# Patient Record
Sex: FEMALE
[De-identification: names, ages, dates, MRNs, and addresses within clinical notes are randomized per-mention and may not be internally consistent; named-entity substitution may affect disease eponyms.]

---

## 2019-04-24 ENCOUNTER — INBOUND DOCUMENT (OUTPATIENT)
Age: 39
End: 2019-04-24

## 2019-05-06 ENCOUNTER — APPOINTMENT (OUTPATIENT)
Dept: OTOLARYNGOLOGY | Facility: CLINIC | Age: 39
End: 2019-05-06
Payer: COMMERCIAL

## 2019-05-06 VITALS
HEIGHT: 66 IN | OXYGEN SATURATION: 98 % | BODY MASS INDEX: 23.3 KG/M2 | SYSTOLIC BLOOD PRESSURE: 116 MMHG | WEIGHT: 145 LBS | TEMPERATURE: 98.4 F | DIASTOLIC BLOOD PRESSURE: 72 MMHG | HEART RATE: 78 BPM

## 2019-05-06 DIAGNOSIS — Z78.9 OTHER SPECIFIED HEALTH STATUS: ICD-10-CM

## 2019-05-06 DIAGNOSIS — E04.1 NONTOXIC SINGLE THYROID NODULE: ICD-10-CM

## 2019-05-06 DIAGNOSIS — Z86.79 PERSONAL HISTORY OF OTHER DISEASES OF THE CIRCULATORY SYSTEM: ICD-10-CM

## 2019-05-06 DIAGNOSIS — Z83.3 FAMILY HISTORY OF DIABETES MELLITUS: ICD-10-CM

## 2019-05-06 DIAGNOSIS — Z80.9 FAMILY HISTORY OF MALIGNANT NEOPLASM, UNSPECIFIED: ICD-10-CM

## 2019-05-06 PROCEDURE — 99244 OFF/OP CNSLTJ NEW/EST MOD 40: CPT

## 2019-05-06 RX ORDER — ERGOCALCIFEROL 1.25 MG/1
CAPSULE ORAL
Refills: 0 | Status: ACTIVE | COMMUNITY

## 2019-05-06 RX ORDER — NORGESTIMATE AND ETHINYL ESTRADIOL 7DAYSX3 LO
KIT ORAL
Refills: 0 | Status: ACTIVE | COMMUNITY

## 2019-05-06 RX ORDER — AMLODIPINE BESYLATE 10 MG/1
10 TABLET ORAL
Refills: 0 | Status: ACTIVE | COMMUNITY

## 2019-05-06 NOTE — HISTORY OF PRESENT ILLNESS
[de-identified] : 37 y/o F with a h/o an anterior neck cyst for the past 8 years.  She reports no symptoms related to the lesion and states that it has not grown over the years.  It has not been infected.

## 2019-05-06 NOTE — CONSULT LETTER
[Dear  ___] : Dear  [unfilled], [Please see my note below.] : Please see my note below. [Consult Letter:] : I had the pleasure of evaluating your patient, [unfilled]. [Consult Closing:] : Thank you very much for allowing me to participate in the care of this patient.  If you have any questions, please do not hesitate to contact me. [Sincerely,] : Sincerely, [FreeTextEntry3] : Bart Pretty MD, FACS\par Clinical \par Dept. of Otolaryngology\par [facility]\par  [DrAylin  ___] : Dr. LEE [FreeTextEntry2] : Jann Nichols MD

## 2019-05-06 NOTE — DATA REVIEWED
[de-identified] : Images reviewed:\par \par Sono thyroid ([facility] 8/22/11) - 23 x 13 x 19 complex cyst adjacent to the thyroid isthmus\par \par Sono thyroid ([facility] 3/10/18) - 27 x 14 x 27 mm cyst, largely unchanged compared to 2011.  Debris within  [de-identified] : MRI neck ([facility] 9/12/11) - Images reviewed. 18 mm cystic lesion embedded within the strap muscles.

## 2019-05-06 NOTE — PHYSICAL EXAM
[Midline] : trachea located in midline position [Normal] : no rashes [de-identified] : Approx 2.5 cm soft, nontender cystic lesion at sternal notch.

## 2019-05-06 NOTE — REVIEW OF SYSTEMS
[Sneezing] : sneezing [Seasonal Allergies] : seasonal allergies [Sinus Pain] : sinus pain [Sinus Pressure] : sinus pressure [Eyes Itch] : itching of the eyes [Cough] : cough [Negative] : Heme/Lymph [Ear Pain] : no ear pain [Ear Itch] : no ear itch [Hearing Loss] : no hearing loss [Vertigo] : no vertigo [Recurrent Ear Infections] : no recurrent ear infections [Ear Drainage] : no ear drainage [Ear Noises] : no ear noises [Lightheadedness] : no lightheadedness [Nasal Congestion] : no nasal congestion [Nose Bleeds] : no nose bleeds [Problem Snoring] : no snoring problems [Recurrent Sinus Infections] : no recurrent sinus infections [Discolored Nasal Discharge] : no discolored nasal discharge [Snoring With Pauses] : no snoring with pauses [Sense Of Smell Problem] : no sense of smell problem [Throat Pain] : no throat pain [Throat Clearing] : no throat clearing [Hoarseness] : no hoarseness [Throat Dryness] : no throat dryness [Throat Itching] : no throat itching [Swelling Neck] : no neck swelling [Fever] : no fever [Chills] : no chills [Swelling Face] : no face swelling [Feeling Poorly] : not feeling poorly [Feeling Tired] : not feeling tired [Recent Weight Gain (___ Lbs)] : no recent weight gain [Red Eyes] : eyes not red [Recent Weight Loss (___ Lbs)] : no recent weight loss [Eye Pain] : no eye pain [Eyesight Problems] : no eyesight problems [Discharge From Eyes] : no purulent discharge from the eyes [Dry Eyes] : no dry eyes [Chest Pain] : no chest pain [Heart Rate Is Slow] : the heart rate was not slow [Heart Rate Is Fast] : the heart rate was not fast [Lower Ext Edema] : no lower extremity edema [Palpitations] : no palpitations [Leg Claudication] : no intermittent leg claudication [SOB on Exertion] : no shortness of breath during exertion [Shortness Of Breath] : no shortness of breath [Wheezing] : no wheezing [PND] : no PND [Abdominal Pain] : no abdominal pain [Orthopnea] : no orthopnea [Vomiting] : no vomiting [Diarrhea] : no diarrhea [Constipation] : no constipation [Melena] : no melena [Heartburn] : no heartburn [Incontinence] : no incontinence [Pelvic Pain] : no pelvic pain [Dysuria] : no dysuria [Arthralgias] : no arthralgias [Vaginal Discharge] : no vaginal discharge [Dysmenorrhea] : no dysmenorrhea [Abn Vaginal Bleeding] : no unexplained vaginal bleeding [Joint Stiffness] : no joint stiffness [Joint Pain] : no joint pain [Joint Swelling] : no joint swelling [Limb Swelling] : no limb swelling [Limb Pain] : no limb pain [Skin Wound] : no skin wound [Skin Lesions] : no skin lesions [Itching] : no itching [Breast Lump] : no breast lump [Confusion] : no confusion [Breast Pain] : no breast pain [Change In A Mole] : no change in a mole [Dizziness] : no dizziness [Fainting] : no fainting [Convulsions] : no convulsions [Difficulty Walking] : no difficulty walking [Limb Weakness] : no limb weakness [Suicidal] : not suicidal [Anxiety] : no anxiety [Sleep Disturbances] : no sleep disturbances [Depression] : no depression [Change In Personality] : no personality change [Proptosis] : no proptosis [Emotional Problems] : no emotional problems [Hot Flashes] : no hot flashes [Muscle Weakness] : no muscle weakness [Deepening Of The Voice] : no deepening of the voice [Feelings Of Weakness] : no feelings of weakness [Swollen Glands] : no swollen glands [Easy Bruising] : no tendency for easy bruising [Easy Bleeding] : no tendency for easy bleeding [Swollen Glands In The Neck] : no swollen glands in the neck [FreeTextEntry3] : Watery eyes [de-identified] : Headache [de-identified] : feeling cooler than others

## 2020-11-09 ENCOUNTER — RESULT REVIEW (OUTPATIENT)
Age: 40
End: 2020-11-09

## 2022-09-09 ENCOUNTER — RESULT REVIEW (OUTPATIENT)
Age: 42
End: 2022-09-09